# Patient Record
Sex: MALE | Race: BLACK OR AFRICAN AMERICAN | NOT HISPANIC OR LATINO | Employment: PART TIME | URBAN - METROPOLITAN AREA
[De-identification: names, ages, dates, MRNs, and addresses within clinical notes are randomized per-mention and may not be internally consistent; named-entity substitution may affect disease eponyms.]

---

## 2018-02-13 ENCOUNTER — HOSPITAL ENCOUNTER (EMERGENCY)
Facility: HOSPITAL | Age: 23
Discharge: LEFT BEFORE DISCHARGE | End: 2018-02-13

## 2018-02-13 ENCOUNTER — APPOINTMENT (OUTPATIENT)
Dept: GENERAL RADIOLOGY | Facility: HOSPITAL | Age: 23
End: 2018-02-13

## 2018-02-13 VITALS
BODY MASS INDEX: 24.8 KG/M2 | HEART RATE: 114 BPM | WEIGHT: 140 LBS | RESPIRATION RATE: 18 BRPM | TEMPERATURE: 98.4 F | SYSTOLIC BLOOD PRESSURE: 106 MMHG | HEIGHT: 63 IN | OXYGEN SATURATION: 100 % | DIASTOLIC BLOOD PRESSURE: 67 MMHG

## 2018-02-13 PROCEDURE — 73630 X-RAY EXAM OF FOOT: CPT

## 2018-02-13 PROCEDURE — 99211 OFF/OP EST MAY X REQ PHY/QHP: CPT

## 2018-02-13 NOTE — ED NOTES
"Patient states \"I can't wait this long, I'm just going to go home and use my own crutches.\" Apologized to patient for wait time. LWBS forms signed by patient and left with family. MAGDALENO Diaz notified.     Lissette Cabrera RN  02/13/18 1045    "

## 2018-02-13 NOTE — ED NOTES
"Patient states \"I tried to jump up into a truck last night and hit my right foot on a metal bar and now I can't walk on it.\"     Lissette Cabrera RN  02/13/18 0931       Lissette Cabrera RN  02/13/18 0914    "

## 2019-10-29 ENCOUNTER — TRANSCRIBE ORDERS (OUTPATIENT)
Dept: ADMINISTRATIVE | Facility: HOSPITAL | Age: 24
End: 2019-10-29

## 2019-10-29 DIAGNOSIS — R10.30 LOWER ABDOMINAL PAIN: Primary | ICD-10-CM

## 2019-11-05 ENCOUNTER — HOSPITAL ENCOUNTER (OUTPATIENT)
Dept: ULTRASOUND IMAGING | Facility: HOSPITAL | Age: 24
Discharge: HOME | End: 2019-11-05
Admitting: NURSE PRACTITIONER

## 2019-11-05 DIAGNOSIS — R10.30 LOWER ABDOMINAL PAIN: ICD-10-CM

## 2019-11-05 PROCEDURE — 76700 US EXAM ABDOM COMPLETE: CPT

## 2023-09-12 ENCOUNTER — OFFICE VISIT (OUTPATIENT)
Dept: INTERNAL MEDICINE | Facility: CLINIC | Age: 28
End: 2023-09-12
Payer: COMMERCIAL

## 2023-09-12 VITALS
SYSTOLIC BLOOD PRESSURE: 118 MMHG | TEMPERATURE: 96.6 F | DIASTOLIC BLOOD PRESSURE: 70 MMHG | HEIGHT: 63 IN | HEART RATE: 60 BPM | OXYGEN SATURATION: 98 % | BODY MASS INDEX: 21.44 KG/M2 | WEIGHT: 121 LBS

## 2023-09-12 DIAGNOSIS — L73.1 INGROWN HAIR: Primary | ICD-10-CM

## 2023-09-12 DIAGNOSIS — Z00.00 ENCOUNTER FOR MEDICAL EXAMINATION TO ESTABLISH CARE: ICD-10-CM

## 2023-09-12 NOTE — PROGRESS NOTES
"Chief Complaint  Establish Care and Rash (Right upper thigh for a while)    Subjective        Get Malik presents to [facility]  History of Present Illness    Patient is a pleasant 28-year-old male who is new to me and new to the office.  Patient is here today to establish care and for a problem focused visit related to an ingrown hair on her right buttock area.  This has been here for quite some time and denies any redness, swelling, fever or chills at this time.  Patient has not used anything over-the-counter for this problem.    Objective   Vital Signs:  /70   Pulse 60   Temp 96.6 °F (35.9 °C)   Ht 160 cm (62.99\")   Wt 54.9 kg (121 lb)   SpO2 98%   BMI 21.44 kg/m²   Estimated body mass index is 21.44 kg/m² as calculated from the following:    Height as of this encounter: 160 cm (62.99\").    Weight as of this encounter: 54.9 kg (121 lb).       BMI is within normal parameters. No other follow-up for BMI required.      Physical Exam  Vitals and nursing note reviewed.   Constitutional:       Appearance: Normal appearance.   HENT:      Head: Normocephalic.      Nose: Nose normal.      Mouth/Throat:      Mouth: Mucous membranes are moist.   Eyes:      Pupils: Pupils are equal, round, and reactive to light.   Cardiovascular:      Rate and Rhythm: Normal rate and regular rhythm.      Pulses: Normal pulses.      Heart sounds: Normal heart sounds.      Comments: No peripheral edema  Pulmonary:      Effort: Pulmonary effort is normal. No respiratory distress.      Breath sounds: Normal breath sounds. No stridor. No wheezing, rhonchi or rales.      Comments: Denies shortness of breath  Chest:      Chest wall: No tenderness.   Musculoskeletal:         General: Normal range of motion.   Skin:     General: Skin is warm.      Capillary Refill: Capillary refill takes less than 2 seconds.      Coloration: Skin is ashen.      Findings: Rash is scaling.          Neurological:      Mental " Status: He is alert and oriented to person, place, and time.   Psychiatric:         Behavior: Behavior normal.      Result Review :                   Assessment and Plan   Diagnoses and all orders for this visit:    1. Ingrown hair (Primary)    2. Encounter for medical examination to establish care      Try epsom salt soaks for this ingrown hair.  If you develop any worsening symptoms please contact me.   He will return in 4 weeks for a annual PE.        I spent 20 minutes caring for Get on this date of service. This time includes time spent by me in the following activities:preparing for the visit, reviewing tests, obtaining and/or reviewing a separately obtained history, performing a medically appropriate examination and/or evaluation , counseling and educating the patient/family/caregiver, ordering medications, tests, or procedures, referring and communicating with other health care professionals , documenting information in the medical record, independently interpreting results and communicating that information with the patient/family/caregiver, and care coordination  Follow Up   Return in about 4 weeks (around 10/10/2023) for Annual physical.  Patient was given instructions and counseling regarding his condition or for health maintenance advice. Please see specific information pulled into the AVS if appropriate.

## 2023-09-12 NOTE — PATIENT INSTRUCTIONS
Try epsom salt soaks for this ingrown hair.  If you develop any worsening symptoms please contact me.

## 2023-09-15 ENCOUNTER — PATIENT ROUNDING (BHMG ONLY) (OUTPATIENT)
Dept: INTERNAL MEDICINE | Facility: CLINIC | Age: 28
End: 2023-09-15
Payer: COMMERCIAL

## 2023-09-15 NOTE — PROGRESS NOTES
September 15, 2023    Hello, may I speak with Get Malik?    My name is EDWIN      I am  with [facility]  [address]  [place] [address] [phone].    Before we get started may I verify your date of birth? 1995    I am calling to officially welcome you to our practice and ask about your recent visit. Is this a good time to talk? yes    Tell me about your visit with us. What things went well?  YES       We're always looking for ways to make our patients' experiences even better. Do you have recommendations on ways we may improve?  no    Overall were you satisfied with your first visit to our practice? yes       I appreciate you taking the time to speak with me today. Is there anything else I can do for you? no      Thank you, and have a great day.

## 2023-10-17 ENCOUNTER — OFFICE VISIT (OUTPATIENT)
Dept: INTERNAL MEDICINE | Facility: CLINIC | Age: 28
End: 2023-10-17
Payer: COMMERCIAL

## 2023-10-17 VITALS
DIASTOLIC BLOOD PRESSURE: 70 MMHG | HEART RATE: 65 BPM | BODY MASS INDEX: 22.5 KG/M2 | SYSTOLIC BLOOD PRESSURE: 112 MMHG | RESPIRATION RATE: 18 BRPM | HEIGHT: 63 IN | WEIGHT: 127 LBS | OXYGEN SATURATION: 97 % | TEMPERATURE: 96.5 F

## 2023-10-17 DIAGNOSIS — Z13.220 SCREENING CHOLESTEROL LEVEL: ICD-10-CM

## 2023-10-17 DIAGNOSIS — L73.1 INGROWN HAIR: Primary | ICD-10-CM

## 2023-10-17 DIAGNOSIS — Z00.00 ANNUAL PHYSICAL EXAM: ICD-10-CM

## 2023-10-17 DIAGNOSIS — Z23 NEED FOR DIPHTHERIA-TETANUS-PERTUSSIS (TDAP) VACCINE: ICD-10-CM

## 2023-10-17 DIAGNOSIS — Z11.59 NEED FOR HEPATITIS C SCREENING TEST: ICD-10-CM

## 2023-10-17 DIAGNOSIS — Z13.1 SCREENING FOR DIABETES MELLITUS: ICD-10-CM

## 2023-10-17 NOTE — PROGRESS NOTES
"Chief Complaint  Annual Exam    Subjective        Get Malik presents to [facility]  History of Present Illness      Patient is a pleasant 28-year-old male who I have seen in the office 1 time previously.  Patient is here today to complete his annual physical exam and to speak about an ingrown hair that he has on his buttock area for more than 6 months.  Denies any fever or chills at this time.  No other problems on today's office visit.    Patient works full-time in construction.  Has children at home that are school-age.    Working out regularly and eats a well balanced diet.     Patient reports normal BM and urinary output.    Mental health is good.  Denies any suicidal ideation and/or harm towards others.    Is not up-to-date on dental and vision screenings at this time.      Objective   Vital Signs:  /70   Pulse 65   Temp 96.5 °F (35.8 °C)   Resp 18   Ht 160 cm (62.99\")   Wt 57.6 kg (127 lb)   SpO2 97%   BMI 22.50 kg/m²   Estimated body mass index is 22.5 kg/m² as calculated from the following:    Height as of this encounter: 160 cm (62.99\").    Weight as of this encounter: 57.6 kg (127 lb).       BMI is within normal parameters. No other follow-up for BMI required.      Physical Exam  Vitals and nursing note reviewed.   Constitutional:       Appearance: Normal appearance.   HENT:      Head: Normocephalic.      Right Ear: Tympanic membrane, ear canal and external ear normal. There is no impacted cerumen.      Left Ear: Tympanic membrane, ear canal and external ear normal. There is no impacted cerumen.      Nose: Nose normal.      Mouth/Throat:      Mouth: Mucous membranes are moist.   Eyes:      Pupils: Pupils are equal, round, and reactive to light.   Cardiovascular:      Rate and Rhythm: Normal rate and regular rhythm.      Pulses: Normal pulses.      Heart sounds: Normal heart sounds.      Comments: No peripheral edema noted  Pulmonary:      Effort: Pulmonary " effort is normal. No respiratory distress.      Breath sounds: Normal breath sounds. No stridor. No wheezing, rhonchi or rales.      Comments: Denies shortness of breath  Chest:      Chest wall: No tenderness.   Musculoskeletal:         General: Normal range of motion.   Skin:     General: Skin is warm.      Capillary Refill: Capillary refill takes less than 2 seconds.   Neurological:      Mental Status: He is alert and oriented to person, place, and time.   Psychiatric:         Behavior: Behavior normal.        Result Review :        Office Visit with Stacey Mercado APRN (09/12/2023)            Assessment and Plan   Diagnoses and all orders for this visit:    1. Ingrown hair (Primary)  -     Ambulatory Referral to Dermatology  -     CBC & Differential  -     Comprehensive Metabolic Panel  -     Hemoglobin A1c  -     Lipid Panel    2. Annual physical exam  -     CBC & Differential  -     Comprehensive Metabolic Panel  -     Hemoglobin A1c  -     Lipid Panel    3. Screening cholesterol level  -     CBC & Differential  -     Comprehensive Metabolic Panel  -     Hemoglobin A1c  -     Lipid Panel    4. Screening for diabetes mellitus  -     CBC & Differential  -     Comprehensive Metabolic Panel  -     Hemoglobin A1c  -     Lipid Panel    5. Need for diphtheria-tetanus-pertussis (Tdap) vaccine  -     CBC & Differential  -     Comprehensive Metabolic Panel  -     Hemoglobin A1c  -     Lipid Panel    6. Need for hepatitis C screening test  -     CBC & Differential  -     Comprehensive Metabolic Panel  -     Hemoglobin A1c  -     Lipid Panel    Other orders  -     Tdap Vaccine => 6yo IM (BOOSTRIX)      We will do ambulatory screening test at this time and will contact patient with his results.  Tdap was given in the office today.  Patient will go to pharmacy and get his flu vaccination.  Ambulatory referral to dermatology has been placed at this time.  Patient agrees with treatment plan and we will see him yearly  physicals.       I spent 30 minutes caring for Get on this date of service. This time includes time spent by me in the following activities:preparing for the visit, reviewing tests, obtaining and/or reviewing a separately obtained history, performing a medically appropriate examination and/or evaluation , counseling and educating the patient/family/caregiver, ordering medications, tests, or procedures, referring and communicating with other health care professionals , documenting information in the medical record, independently interpreting results and communicating that information with the patient/family/caregiver, and care coordination  Follow Up   Return in about 1 year (around 10/17/2024) for Annual physical.  Patient was given instructions and counseling regarding his condition or for health maintenance advice. Please see specific information pulled into the AVS if appropriate.